# Patient Record
Sex: MALE | Race: BLACK OR AFRICAN AMERICAN | NOT HISPANIC OR LATINO | URBAN - NONMETROPOLITAN AREA
[De-identification: names, ages, dates, MRNs, and addresses within clinical notes are randomized per-mention and may not be internally consistent; named-entity substitution may affect disease eponyms.]

---

## 2024-01-11 ENCOUNTER — OFFICE (OUTPATIENT)
Dept: URBAN - NONMETROPOLITAN AREA CLINIC 5 | Facility: CLINIC | Age: 64
End: 2024-01-11
Payer: MEDICAID

## 2024-01-11 VITALS
HEART RATE: 94 BPM | HEIGHT: 73 IN | SYSTOLIC BLOOD PRESSURE: 125 MMHG | RESPIRATION RATE: 17 BRPM | DIASTOLIC BLOOD PRESSURE: 73 MMHG | WEIGHT: 197 LBS

## 2024-01-11 DIAGNOSIS — K76.89 OTHER SPECIFIED DISEASES OF LIVER: ICD-10-CM

## 2024-01-11 PROCEDURE — 99203 OFFICE O/P NEW LOW 30 MIN: CPT | Performed by: INTERNAL MEDICINE

## 2024-01-11 NOTE — SERVICENOTES
Given patient's reported liver cysts will plan for CT liver protocol for further assessment.  Will plan to see back in clinic as needed based on imaging.

## 2024-01-11 NOTE — SERVICEHPINOTES
Adam Rutherford   is a   64 yo  male  with a past medical history of HTN, HLD, and BPH who presents to establish care for liver cyst.  Per referral patient was noted to have a liver cyst.  Only recent CT scan available for review from October 2023 noted "aortic stent graft extending from distal aortic arch inferiorly some 7 cm.  The aorta is otherwise unremarkable.  Aortic root is measured at 3.5 cm.  The renal arteries and celiac axis and SMA are widely patent.  Moderate goiter formation.  2 cm left adrenal adenoma.  No other acute finding".  The patient's liver, spleen, pancreas, and gallbladder were noted to be unremarkable.  Recent imaging is not available for review.  Patient does not believe he has completed a CT liver protocol.  He denies any jaundice, confusion, hematemesis, hematochezia, melena, abdominal pain, nausea, vomiting, dysphagia, odynophagia, diarrhea, or constipation.